# Patient Record
Sex: MALE | Race: WHITE | Employment: FULL TIME | URBAN - METROPOLITAN AREA
[De-identification: names, ages, dates, MRNs, and addresses within clinical notes are randomized per-mention and may not be internally consistent; named-entity substitution may affect disease eponyms.]

---

## 2019-08-22 ENCOUNTER — OFFICE VISIT (OUTPATIENT)
Dept: FAMILY MEDICINE CLINIC | Facility: CLINIC | Age: 44
End: 2019-08-22

## 2019-08-22 VITALS
SYSTOLIC BLOOD PRESSURE: 120 MMHG | DIASTOLIC BLOOD PRESSURE: 90 MMHG | RESPIRATION RATE: 16 BRPM | WEIGHT: 184.81 LBS | TEMPERATURE: 99 F | HEIGHT: 66 IN | BODY MASS INDEX: 29.7 KG/M2 | HEART RATE: 75 BPM | OXYGEN SATURATION: 98 %

## 2019-08-22 DIAGNOSIS — H10.9 BACTERIAL CONJUNCTIVITIS OF LEFT EYE: Primary | ICD-10-CM

## 2019-08-22 PROCEDURE — 99203 OFFICE O/P NEW LOW 30 MIN: CPT | Performed by: NURSE PRACTITIONER

## 2019-08-22 RX ORDER — CIPROFLOXACIN HYDROCHLORIDE 3.5 MG/ML
1 SOLUTION/ DROPS TOPICAL
Qty: 1 BOTTLE | Refills: 0 | Status: SHIPPED | OUTPATIENT
Start: 2019-08-22

## 2019-08-22 NOTE — PROGRESS NOTES
CHIEF COMPLAINT:   Patient presents with:  Eye Problem: lt eye , pink, pain, crust, discharge, x this morning       HPI:   Selma Trammell is a 37year old male who presents with chief complaint of eye irritation. Symptoms began 3  days ago.  Symptoms have HENT: atraumatic, normocephalic, TMs non injected, without bulging or fluid bilaterally. Nasal mucosa pink and non inflamed. Posterior pharynx pink without lesions. NECK: supple, non tender  LUNGS: clear to auscultation bilaterally.    CARDIO: RRR without · Apply a warm compress (towel soaked in warm water) to the affected eye 3 to 4 times a day. Do this just before applying medicine to the eye. · Use a warm, wet cloth to wipe away crusting of the eyelids in the morning.  This is caused by mucus drainage du

## 2019-08-22 NOTE — PATIENT INSTRUCTIONS
Bacterial Conjunctivitis    You have an infection in the membranes covering the white part of the eye. This part of the eye is called the conjunctiva. The infection is called conjunctivitis.  The most common symptoms of conjunctivitis include a thick, pus © [id number] [employer] [address]. [address] [facility], [facility] [place]. All rights reserved. This information is not intended as a substitute for professional medical care. Always follow your healthcare professional's instructions.